# Patient Record
Sex: FEMALE
[De-identification: names, ages, dates, MRNs, and addresses within clinical notes are randomized per-mention and may not be internally consistent; named-entity substitution may affect disease eponyms.]

---

## 2024-05-14 PROBLEM — Z00.00 ENCOUNTER FOR PREVENTIVE HEALTH EXAMINATION: Status: ACTIVE | Noted: 2024-05-14

## 2024-05-16 ENCOUNTER — NON-APPOINTMENT (OUTPATIENT)
Age: 34
End: 2024-05-16

## 2024-05-22 ENCOUNTER — NON-APPOINTMENT (OUTPATIENT)
Age: 34
End: 2024-05-22

## 2024-05-22 ENCOUNTER — APPOINTMENT (OUTPATIENT)
Dept: NEUROLOGY | Facility: CLINIC | Age: 34
End: 2024-05-22
Payer: COMMERCIAL

## 2024-05-22 VITALS
DIASTOLIC BLOOD PRESSURE: 84 MMHG | HEART RATE: 122 BPM | SYSTOLIC BLOOD PRESSURE: 123 MMHG | TEMPERATURE: 97 F | OXYGEN SATURATION: 97 %

## 2024-05-22 VITALS — BODY MASS INDEX: 42.33 KG/M2 | WEIGHT: 230 LBS | HEIGHT: 62 IN

## 2024-05-22 DIAGNOSIS — R20.9 UNSPECIFIED DISTURBANCES OF SKIN SENSATION: ICD-10-CM

## 2024-05-22 DIAGNOSIS — M47.812 SPONDYLOSIS W/OUT MYELOPATHY OR RADICULOPATHY, CERVICAL REGION: ICD-10-CM

## 2024-05-22 DIAGNOSIS — M71.38 OTHER BURSAL CYST, OTHER SITE: ICD-10-CM

## 2024-05-22 DIAGNOSIS — G95.0 SYRINGOMYELIA AND SYRINGOBULBIA: ICD-10-CM

## 2024-05-22 DIAGNOSIS — G43.909 MIGRAINE, UNSPECIFIED, NOT INTRACTABLE, W/OUT STATUS MIGRAINOSUS: ICD-10-CM

## 2024-05-22 PROCEDURE — G2211 COMPLEX E/M VISIT ADD ON: CPT

## 2024-05-22 PROCEDURE — 99205 OFFICE O/P NEW HI 60 MIN: CPT

## 2024-06-20 PROBLEM — R20.9 SENSORY DISTURBANCE: Status: ACTIVE | Noted: 2024-06-20

## 2024-06-20 NOTE — DATA REVIEWED
[de-identified] : MRI of the right tibia and fibula without contrast July 2023 demonstrates a low-grade strain of the distal gastrocnemius on the right, not mentioned on the April 2024 report MRI lumbar spine February 2024 shows a synovial cyst at the right L4-L5 level posteriorly otherwise unremarkable MRI brain noncontrast unremarkable January 2023 MRI lumbar spine March 18, 2021 demonstrates synovial cyst measuring 7.4 mm MRI thoracic/lumbar spine April 2024 unremarkable other than a small T4 level syrinx measuring 3 mm MRI cervical spine June 2021 demonstrates annular bulge C3-C4 with subtle subligamentous midline disc herniation C4-C5 without abnormal cord signal.  Minimal effacement of ventral thecal sac  Labs reviewed from May 15, 2024 normal CBC, unremarkable iron studies, B12, folate, creatinine kinase, negative Lyme, A1c 5.3, ESR 32,

## 2024-06-20 NOTE — PHYSICAL EXAM
[FreeTextEntry1] :   Vitals: unrevealing   Exam:   AO3. Normally conversant. full affect. Follows commands, names, and repeats. Good attention.   PERRL, VFF, EOMI, no nystagmus, face symmetric, TUP at midline.   Motor:       R:    L: Del      5    5 Bi      5    5 Tri      5    5 Wrist Extensors   5    5 Finger abductors   5    5      5    5   HF      5    5 KE      5    5 KF      5    5 DF      5    5 PF      5    5   Tone     R    L UE      0    0 LE      0    0   Sensory    RUE   LUE   RLE  LLE LT      +    +   +   + Vib      +    +   +   + JPS      +    +   +   + PP      +    +   +   + Temp     +    +   +   +   Reflexes:       R    L   Biceps   2 2 BR    2 2 Pat    3 3 AJ     2 2   TOES     F    F   Coordination:       R    L  FTN     0    0 ELIOT     0    0 HTS     0    0   Other           Gait: normal, can heel, toe, tandem

## 2024-06-20 NOTE — ASSESSMENT
[FreeTextEntry1] : 33-year-old woman with history of chronic migraine, spondylotic neck and lower back pain, presenting with dysesthesia involving the face arms trunk and legs with imaging demonstrating incidental right L4-L5 synovial cyst and T4 syrinx unlikely related to the clinical symptomatology.  The high cervical annular disc herniation observed on MRI in 2021 could potentially contribute to compression of the spinothalamic and trigeminal spinal tracts as a plausible explanation for her symptoms versus fibromyalgia.  The right calf pain was confirmed to be a low-grade distal gastrocnemius strain.   Plan: Advised physical therapy, physiatry, pain management referral.  Deferred at this time per patient preference. Pending evaluation with a neurosurgeon at [facility] Repeat cervical spine MRI without gadolinium MRI brain w/o amy (given face, arm, leg dysesthesia, rule out structural lesion in brain) Upload CD of imaging to [place] Sumatriptan 50 mg at onset of migraine and after 2 hours as needed no more than twice a week for abortive migraine treatment Return to clinic after MRI

## 2024-06-20 NOTE — HISTORY OF PRESENT ILLNESS
[FreeTextEntry1] : 33-year-old woman referred for sensory symptoms.  She has a chronic history of low back pain for about 8 years in addition to chronic neck pain and headache disorder.  She states that about 18 months ago, after the delivery of her third child, she developed right calf pain described as a dull nonradiating ache, discovered to be a low-grade strain of the distal gastrocnemius.  In April 2024, she developed dysesthesia of the face (mostly perioral), arms (Left arm more than right), and legs (right leg more than left from waist down). MRI of the thoracic spine demonstrated a 3 x 3 mm T4 level syrinx of unclear significance to her symptoms.  With regards to the headaches they are associated with preceding visual aura nausea and sound sensitivity superimposed on a chronic daily tension headache.  The migrainous headaches occur infrequently less than once a month for which she takes sumatriptan.

## 2024-06-26 ENCOUNTER — OUTPATIENT (OUTPATIENT)
Dept: OUTPATIENT SERVICES | Facility: HOSPITAL | Age: 34
LOS: 1 days | End: 2024-06-26

## 2024-06-26 ENCOUNTER — TRANSCRIPTION ENCOUNTER (OUTPATIENT)
Age: 34
End: 2024-06-26

## 2024-06-26 ENCOUNTER — APPOINTMENT (OUTPATIENT)
Dept: MRI IMAGING | Facility: CLINIC | Age: 34
End: 2024-06-26
Payer: COMMERCIAL

## 2024-06-26 PROCEDURE — 70551 MRI BRAIN STEM W/O DYE: CPT | Mod: 26
